# Patient Record
Sex: FEMALE | Race: WHITE | NOT HISPANIC OR LATINO | URBAN - NONMETROPOLITAN AREA
[De-identification: names, ages, dates, MRNs, and addresses within clinical notes are randomized per-mention and may not be internally consistent; named-entity substitution may affect disease eponyms.]

---

## 2017-06-16 ENCOUNTER — OFFICE (OUTPATIENT)
Dept: URBAN - NONMETROPOLITAN AREA CLINIC 13 | Facility: CLINIC | Age: 52
End: 2017-06-16

## 2017-06-16 VITALS
TEMPERATURE: 98.1 F | HEART RATE: 79 BPM | HEIGHT: 65 IN | SYSTOLIC BLOOD PRESSURE: 164 MMHG | DIASTOLIC BLOOD PRESSURE: 94 MMHG | WEIGHT: 251 LBS

## 2017-06-16 VITALS — HEIGHT: 65 IN

## 2017-06-16 DIAGNOSIS — R13.10 DYSPHAGIA, UNSPECIFIED: ICD-10-CM

## 2017-06-16 DIAGNOSIS — R19.7 DIARRHEA, UNSPECIFIED: ICD-10-CM

## 2017-06-16 DIAGNOSIS — R10.84 GENERALIZED ABDOMINAL PAIN: ICD-10-CM

## 2017-06-16 DIAGNOSIS — R10.30 LOWER ABDOMINAL PAIN, UNSPECIFIED: ICD-10-CM

## 2017-06-16 DIAGNOSIS — K21.9 GASTRO-ESOPHAGEAL REFLUX DISEASE WITHOUT ESOPHAGITIS: ICD-10-CM

## 2017-06-16 DIAGNOSIS — K62.5 HEMORRHAGE OF ANUS AND RECTUM: ICD-10-CM

## 2017-06-16 LAB
AMYLASE: 19 U/L — LOW (ref 25–125)
CBC EMERALD: HEMATOCRIT: 46.3 % (ref 37–47)
CBC EMERALD: HEMOGLOBIN: 15.4 G/DL — HIGH (ref 12–14)
CBC EMERALD: LYMPHS %: 29.5 % (ref 20.5–40.5)
CBC EMERALD: LYMPHS ABSOLUTE: 2.9 10*3U/L (ref 1.3–2.9)
CBC EMERALD: MCH: 28.8 PG (ref 27–32)
CBC EMERALD: MCHC: 33.3 G/DL (ref 32–35)
CBC EMERALD: MCV: 86.5 FL (ref 84–100)
CBC EMERALD: MONOS %: 6.8 % (ref 2–10)
CBC EMERALD: MONOS ABSOLUTE: 0.7 10*3U/L (ref 0.3–3.8)
CBC EMERALD: MPV: 10.4 FL (ref 7.4–10.4)
CBC EMERALD: NEUT. %: 63.7 % (ref 43–65)
CBC EMERALD: NEUT. ABSOLUTE: 6.1 10*3U/L — HIGH (ref 2.2–4.8)
CBC EMERALD: PLATELETS: 259 10*3U/L (ref 130–440)
CBC EMERALD: RBC: 5.4 10*6U/L (ref 4.2–5.4)
CBC EMERALD: RDW: 11.6 % (ref 11.5–15.5)
CBC EMERALD: WBC: 9.7 10*3U/L (ref 4.5–10.5)
COMPLETE METABOLIC: ALBUMIN: 4.3 G/DL (ref 3.5–5.2)
COMPLETE METABOLIC: ALK. PHOS: 95 U/L (ref 40–150)
COMPLETE METABOLIC: ALT: 28 U/L (ref 0–55)
COMPLETE METABOLIC: AST: 22 U/L (ref 5–34)
COMPLETE METABOLIC: BUN: 11 MG/DL (ref 7–26)
COMPLETE METABOLIC: CALCIUM: 9.6 MG/DL (ref 8.4–10.2)
COMPLETE METABOLIC: CHLORIDE: 105 MMOL/L (ref 98–107)
COMPLETE METABOLIC: CO2: 26 MEQ/L (ref 22–29)
COMPLETE METABOLIC: CREATININE: 0.8 MG/DL (ref 0.6–1.3)
COMPLETE METABOLIC: GLUCOSE: 189 MG/DL — HIGH (ref 70–99)
COMPLETE METABOLIC: POTASSIUM: 4.4 MMOL/L (ref 3.5–5.3)
COMPLETE METABOLIC: SODIUM: 141 MMOL/L (ref 136–145)
COMPLETE METABOLIC: TOTAL BILIRUBIN: 0.5 MG/DL (ref 0.2–1.2)
COMPLETE METABOLIC: TOTAL PROTEIN: 6.4 G/DL (ref 6.4–8.3)
LIPASE: 10 U/L (ref 8–78)

## 2017-06-16 PROCEDURE — 80053 COMPREHEN METABOLIC PANEL: CPT

## 2017-06-16 PROCEDURE — 83690 ASSAY OF LIPASE: CPT

## 2017-06-16 PROCEDURE — 85025 COMPLETE CBC W/AUTO DIFF WBC: CPT

## 2017-06-16 PROCEDURE — 36415 COLL VENOUS BLD VENIPUNCTURE: CPT

## 2017-06-16 PROCEDURE — 82150 ASSAY OF AMYLASE: CPT

## 2017-06-16 PROCEDURE — 99205 OFFICE O/P NEW HI 60 MIN: CPT

## 2017-06-16 RX ORDER — ONDANSETRON HYDROCHLORIDE 4 MG/1
TABLET, FILM COATED ORAL
Qty: 2 | Refills: 0 | Status: COMPLETED
Start: 2017-06-16 | End: 2017-07-05

## 2017-06-16 RX ORDER — POLYETHYLENE GLYCOL 3350, SODIUM SULFATE ANHYDROUS, SODIUM BICARBONATE, SODIUM CHLORIDE, POTASSIUM CHLORIDE 236; 22.74; 6.74; 5.86; 2.97 G/4L; G/4L; G/4L; G/4L; G/4L
POWDER, FOR SOLUTION ORAL
Qty: 1 | Refills: 0 | Status: COMPLETED
Start: 2017-06-16 | End: 2017-07-05

## 2017-06-16 RX ORDER — BISACODYL 5 MG
TABLET, DELAYED RELEASE (ENTERIC COATED) ORAL
Qty: 8 | Refills: 0 | Status: COMPLETED
Start: 2017-06-16 | End: 2017-07-05

## 2017-07-05 ENCOUNTER — AMBULATORY SURGICAL CENTER (OUTPATIENT)
Dept: URBAN - NONMETROPOLITAN AREA SURGERY 2 | Facility: SURGERY | Age: 52
End: 2017-07-05
Payer: COMMERCIAL

## 2017-07-05 VITALS
RESPIRATION RATE: 16 BRPM | SYSTOLIC BLOOD PRESSURE: 124 MMHG | WEIGHT: 251 LBS | SYSTOLIC BLOOD PRESSURE: 151 MMHG | DIASTOLIC BLOOD PRESSURE: 95 MMHG | OXYGEN SATURATION: 97 % | HEART RATE: 81 BPM | RESPIRATION RATE: 18 BRPM | HEART RATE: 74 BPM | SYSTOLIC BLOOD PRESSURE: 115 MMHG | SYSTOLIC BLOOD PRESSURE: 142 MMHG | HEIGHT: 65 IN | DIASTOLIC BLOOD PRESSURE: 90 MMHG | OXYGEN SATURATION: 91 % | DIASTOLIC BLOOD PRESSURE: 101 MMHG | OXYGEN SATURATION: 96 % | SYSTOLIC BLOOD PRESSURE: 184 MMHG | DIASTOLIC BLOOD PRESSURE: 68 MMHG | HEART RATE: 76 BPM | HEART RATE: 78 BPM | OXYGEN SATURATION: 93 % | DIASTOLIC BLOOD PRESSURE: 70 MMHG | DIASTOLIC BLOOD PRESSURE: 106 MMHG | HEART RATE: 75 BPM | RESPIRATION RATE: 20 BRPM | SYSTOLIC BLOOD PRESSURE: 176 MMHG | OXYGEN SATURATION: 94 %

## 2017-07-05 DIAGNOSIS — Z86.010 PERSONAL HISTORY OF COLONIC POLYPS: ICD-10-CM

## 2017-07-05 PROBLEM — K64.0 FIRST DEGREE HEMORRHOIDS: Status: ACTIVE | Noted: 2017-07-05

## 2017-07-05 PROBLEM — K57.30 DVRTCLOS OF LG INT W/O PERFORATION OR ABSCESS W/O BLEEDING: Status: ACTIVE | Noted: 2017-07-05

## 2017-07-05 PROCEDURE — G0105 COLORECTAL SCRN; HI RISK IND: HCPCS | Performed by: INTERNAL MEDICINE

## 2017-07-05 RX ORDER — HYDROCORTISONE 25 MG/G
CREAM TOPICAL
Qty: 2 | Refills: 2 | Status: ACTIVE
Start: 2017-07-05

## 2017-07-12 ENCOUNTER — AMBULATORY SURGICAL CENTER (OUTPATIENT)
Dept: URBAN - NONMETROPOLITAN AREA SURGERY 2 | Facility: SURGERY | Age: 52
End: 2017-07-12

## 2017-07-12 ENCOUNTER — OFFICE (OUTPATIENT)
Dept: URBAN - NONMETROPOLITAN AREA CLINIC 13 | Facility: CLINIC | Age: 52
End: 2017-07-12

## 2017-07-12 VITALS
SYSTOLIC BLOOD PRESSURE: 129 MMHG | DIASTOLIC BLOOD PRESSURE: 76 MMHG | HEART RATE: 58 BPM | HEART RATE: 59 BPM | HEIGHT: 65 IN | SYSTOLIC BLOOD PRESSURE: 122 MMHG | RESPIRATION RATE: 18 BRPM | DIASTOLIC BLOOD PRESSURE: 84 MMHG | OXYGEN SATURATION: 98 % | RESPIRATION RATE: 20 BRPM | SYSTOLIC BLOOD PRESSURE: 155 MMHG | DIASTOLIC BLOOD PRESSURE: 106 MMHG | DIASTOLIC BLOOD PRESSURE: 99 MMHG | OXYGEN SATURATION: 94 % | DIASTOLIC BLOOD PRESSURE: 72 MMHG | DIASTOLIC BLOOD PRESSURE: 91 MMHG | SYSTOLIC BLOOD PRESSURE: 142 MMHG | DIASTOLIC BLOOD PRESSURE: 86 MMHG | RESPIRATION RATE: 16 BRPM | HEART RATE: 74 BPM | SYSTOLIC BLOOD PRESSURE: 116 MMHG | HEART RATE: 60 BPM | OXYGEN SATURATION: 97 % | OXYGEN SATURATION: 95 % | SYSTOLIC BLOOD PRESSURE: 167 MMHG | WEIGHT: 251 LBS | OXYGEN SATURATION: 96 % | HEART RATE: 72 BPM | HEART RATE: 76 BPM | DIASTOLIC BLOOD PRESSURE: 64 MMHG | HEART RATE: 66 BPM | SYSTOLIC BLOOD PRESSURE: 139 MMHG | HEART RATE: 75 BPM

## 2017-07-12 VITALS — HEIGHT: 65 IN

## 2017-07-12 DIAGNOSIS — K29.40 CHRONIC ATROPHIC GASTRITIS WITHOUT BLEEDING: ICD-10-CM

## 2017-07-12 DIAGNOSIS — K21.9 GASTRO-ESOPHAGEAL REFLUX DISEASE WITHOUT ESOPHAGITIS: ICD-10-CM

## 2017-07-12 DIAGNOSIS — K31.89 OTHER DISEASES OF STOMACH AND DUODENUM: ICD-10-CM

## 2017-07-12 DIAGNOSIS — K30 FUNCTIONAL DYSPEPSIA: ICD-10-CM

## 2017-07-12 DIAGNOSIS — R13.10 DYSPHAGIA, UNSPECIFIED: ICD-10-CM

## 2017-07-12 DIAGNOSIS — R10.84 GENERALIZED ABDOMINAL PAIN: ICD-10-CM

## 2017-07-12 DIAGNOSIS — K29.70 GASTRITIS, UNSPECIFIED, WITHOUT BLEEDING: ICD-10-CM

## 2017-07-12 PROCEDURE — 43239 EGD BIOPSY SINGLE/MULTIPLE: CPT | Performed by: INTERNAL MEDICINE

## 2017-07-12 PROCEDURE — 88312 SPECIAL STAINS GROUP 1: CPT | Mod: TC | Performed by: INTERNAL MEDICINE

## 2017-07-12 PROCEDURE — 74177 CT ABD & PELVIS W/CONTRAST: CPT | Mod: TC

## 2017-07-12 PROCEDURE — 43248 EGD GUIDE WIRE INSERTION: CPT | Performed by: INTERNAL MEDICINE

## 2017-07-12 PROCEDURE — 00740: CPT | Mod: QS,QZ

## 2017-07-12 PROCEDURE — 88305 TISSUE EXAM BY PATHOLOGIST: CPT | Mod: TC | Performed by: INTERNAL MEDICINE

## 2017-07-12 RX ORDER — ELUXADOLINE 100 MG/1
TABLET, FILM COATED ORAL
Qty: 0 | Refills: 0 | Status: COMPLETED
End: 2017-07-12

## 2017-07-12 RX ORDER — DOCUSATE SODIUM AND SENNOSIDES 50; 8.6 MG/1; MG/1
TABLET, FILM COATED ORAL
Qty: 60 | Refills: 5 | Status: ACTIVE
Start: 2017-07-12

## 2017-07-12 RX ORDER — ESOMEPRAZOLE MAGNESIUM 40 MG/1
CAPSULE, DELAYED RELEASE ORAL
Qty: 90 | Refills: 1 | Status: ACTIVE

## 2017-07-12 RX ORDER — RANITIDINE HYDROCHLORIDE 300 MG/1
TABLET, FILM COATED ORAL
Qty: 90 | Refills: 1 | Status: ACTIVE
Start: 2017-07-12

## 2017-07-12 RX ORDER — POLYETHYLENE GLYCOL 3350 17 G/17G
POWDER, FOR SOLUTION ORAL
Qty: 1 | Refills: 5 | Status: ACTIVE
Start: 2017-07-12

## 2017-07-17 LAB — SURGICAL PROCEDURE: PDF REPORT: (no result)

## 2025-06-16 ENCOUNTER — OFFICE (OUTPATIENT)
Dept: URBAN - NONMETROPOLITAN AREA CLINIC 1 | Facility: CLINIC | Age: 60
End: 2025-06-16
Payer: COMMERCIAL

## 2025-06-16 VITALS
SYSTOLIC BLOOD PRESSURE: 134 MMHG | HEIGHT: 65 IN | DIASTOLIC BLOOD PRESSURE: 98 MMHG | HEART RATE: 89 BPM | WEIGHT: 197 LBS

## 2025-06-16 VITALS — HEIGHT: 65 IN

## 2025-06-16 DIAGNOSIS — K30 FUNCTIONAL DYSPEPSIA: ICD-10-CM

## 2025-06-16 DIAGNOSIS — Z86.0101 PERSONAL HISTORY OF ADENOMATOUS AND SERRATED COLON POLYPS: ICD-10-CM

## 2025-06-16 DIAGNOSIS — K76.0 FATTY (CHANGE OF) LIVER, NOT ELSEWHERE CLASSIFIED: ICD-10-CM

## 2025-06-16 DIAGNOSIS — K21.9 GASTRO-ESOPHAGEAL REFLUX DISEASE WITHOUT ESOPHAGITIS: ICD-10-CM

## 2025-06-16 DIAGNOSIS — Z09 ENCOUNTER FOR FOLLOW-UP EXAMINATION AFTER COMPLETED TREATMEN: ICD-10-CM

## 2025-06-16 DIAGNOSIS — R19.7 DIARRHEA, UNSPECIFIED: ICD-10-CM

## 2025-06-16 DIAGNOSIS — R13.10 DYSPHAGIA, UNSPECIFIED: ICD-10-CM

## 2025-06-16 PROBLEM — Z86.010 PERSONAL HISTORY OF COLON POLYPS (TA): Status: ACTIVE | Noted: 2017-07-05

## 2025-06-16 PROCEDURE — 99204 OFFICE O/P NEW MOD 45 MIN: CPT | Performed by: NURSE PRACTITIONER

## 2025-06-16 PROCEDURE — 76981 USE PARENCHYMA: CPT | Performed by: NURSE PRACTITIONER

## 2025-06-16 RX ORDER — POLYETHYLENE GLYCOL 3350, SODIUM SULFATE ANHYDROUS, SODIUM BICARBONATE, SODIUM CHLORIDE, POTASSIUM CHLORIDE 236; 22.74; 6.74; 5.86; 2.97 G/4L; G/4L; G/4L; G/4L; G/4L
POWDER, FOR SOLUTION ORAL
Qty: 4000 | Refills: 0 | Status: COMPLETED
Start: 2025-06-16 | End: 2025-07-08

## 2025-06-16 NOTE — SERVICEHPINOTES
RUDDY RAHMAN   is a   60   year old  female   here today ,   To schedule a colonoscopy for colon cancer screening purposes.  Last colonoscopy was in 2017 and normal.  A 5 year interval was suggested at that time by the endoscopist for personal history of colon polyps that were found in 2014.
vilma esparza      She reports having postprandial diarrhea multiple times per day.  She has a diabetic and currently on insulin.  She states she had stool studies a year 2 ago at a walk-in clinic but is not sure what she was tested for.  She does report some steatorrhea.  She also has a long history of smoking. 
vilma esparza   she complains of heartburn, dysphagia, dyspepsia, and abdominal pain.  She continues to take medication for gastroesophageal reflux disease with intermittent relief.  She denies any melena or hematemesis.  Dysphagia is worse with food that is dry or solid and texture.  Dysphagia usually happens several times a week.
vilma esparza    She states she has had a recent CT scan at the [facility].  We will obtain a copy of that report.  She states she has a known history of fatty liver disease.  In further interview sounds like her blood sugars are also not under good control.  She is currently seeing endocrinologist and has tried multiple different regimens for management of her diabetes.  She does not adhere to a fatty liver diet nor does she adhere to a diabetic diet.  She states she drinks at least 6 or 7 sodas per day, she snacks on fast food and processed foods all day and eats 3 meals.  She states she drinks about 24 oz of milk on a daily basis.
vilma esparza

## 2025-06-16 NOTE — SERVICENOTES
Schedule procedure(s) at [facility]., We discussed risks and benefits of EGD and colonoscopy.  I explained the risk of bleeding, infection, perforation requiring emergent surgery as well as anesthesia related complications including heart attack, stroke, or pneumonia.  Patient voiced understanding and agrees to proceed.

## 2025-07-08 ENCOUNTER — AMBULATORY SURGICAL CENTER (OUTPATIENT)
Dept: URBAN - NONMETROPOLITAN AREA SURGERY 1 | Facility: SURGERY | Age: 60
End: 2025-07-08
Payer: COMMERCIAL

## 2025-07-08 VITALS
DIASTOLIC BLOOD PRESSURE: 88 MMHG | RESPIRATION RATE: 14 BRPM | HEART RATE: 73 BPM | TEMPERATURE: 98.4 F | DIASTOLIC BLOOD PRESSURE: 74 MMHG | DIASTOLIC BLOOD PRESSURE: 81 MMHG | SYSTOLIC BLOOD PRESSURE: 155 MMHG | HEART RATE: 71 BPM | DIASTOLIC BLOOD PRESSURE: 94 MMHG | DIASTOLIC BLOOD PRESSURE: 88 MMHG | OXYGEN SATURATION: 99 % | OXYGEN SATURATION: 97 % | DIASTOLIC BLOOD PRESSURE: 88 MMHG | RESPIRATION RATE: 16 BRPM | HEART RATE: 82 BPM | HEART RATE: 71 BPM | SYSTOLIC BLOOD PRESSURE: 132 MMHG | HEART RATE: 83 BPM | HEIGHT: 65 IN | SYSTOLIC BLOOD PRESSURE: 130 MMHG | HEART RATE: 84 BPM | RESPIRATION RATE: 14 BRPM | SYSTOLIC BLOOD PRESSURE: 155 MMHG | HEIGHT: 65 IN | HEART RATE: 82 BPM | TEMPERATURE: 98.4 F | HEART RATE: 83 BPM | SYSTOLIC BLOOD PRESSURE: 132 MMHG | OXYGEN SATURATION: 99 % | HEART RATE: 82 BPM | DIASTOLIC BLOOD PRESSURE: 81 MMHG | RESPIRATION RATE: 16 BRPM | WEIGHT: 200 LBS | DIASTOLIC BLOOD PRESSURE: 78 MMHG | RESPIRATION RATE: 20 BRPM | RESPIRATION RATE: 18 BRPM | RESPIRATION RATE: 21 BRPM | HEART RATE: 73 BPM | DIASTOLIC BLOOD PRESSURE: 72 MMHG | HEART RATE: 84 BPM | RESPIRATION RATE: 21 BRPM | SYSTOLIC BLOOD PRESSURE: 132 MMHG | SYSTOLIC BLOOD PRESSURE: 130 MMHG | OXYGEN SATURATION: 99 % | RESPIRATION RATE: 20 BRPM | HEART RATE: 71 BPM | DIASTOLIC BLOOD PRESSURE: 94 MMHG | DIASTOLIC BLOOD PRESSURE: 78 MMHG | TEMPERATURE: 98.4 F | OXYGEN SATURATION: 98 % | SYSTOLIC BLOOD PRESSURE: 138 MMHG | RESPIRATION RATE: 20 BRPM | SYSTOLIC BLOOD PRESSURE: 130 MMHG | SYSTOLIC BLOOD PRESSURE: 155 MMHG | HEART RATE: 83 BPM | SYSTOLIC BLOOD PRESSURE: 134 MMHG | HEART RATE: 73 BPM | OXYGEN SATURATION: 98 % | DIASTOLIC BLOOD PRESSURE: 72 MMHG | RESPIRATION RATE: 14 BRPM | DIASTOLIC BLOOD PRESSURE: 94 MMHG | HEIGHT: 65 IN | DIASTOLIC BLOOD PRESSURE: 74 MMHG | DIASTOLIC BLOOD PRESSURE: 81 MMHG | WEIGHT: 200 LBS | RESPIRATION RATE: 18 BRPM | OXYGEN SATURATION: 97 % | DIASTOLIC BLOOD PRESSURE: 72 MMHG | SYSTOLIC BLOOD PRESSURE: 138 MMHG | SYSTOLIC BLOOD PRESSURE: 134 MMHG | SYSTOLIC BLOOD PRESSURE: 134 MMHG | HEART RATE: 84 BPM | RESPIRATION RATE: 21 BRPM | DIASTOLIC BLOOD PRESSURE: 78 MMHG | SYSTOLIC BLOOD PRESSURE: 138 MMHG | WEIGHT: 200 LBS | OXYGEN SATURATION: 97 % | OXYGEN SATURATION: 98 % | DIASTOLIC BLOOD PRESSURE: 74 MMHG | RESPIRATION RATE: 16 BRPM | RESPIRATION RATE: 18 BRPM

## 2025-07-08 DIAGNOSIS — D12.2 BENIGN NEOPLASM OF ASCENDING COLON: ICD-10-CM

## 2025-07-08 DIAGNOSIS — R19.7 DIARRHEA, UNSPECIFIED: ICD-10-CM

## 2025-07-08 DIAGNOSIS — D12.3 BENIGN NEOPLASM OF TRANSVERSE COLON: ICD-10-CM

## 2025-07-08 DIAGNOSIS — R13.10 DYSPHAGIA, UNSPECIFIED: ICD-10-CM

## 2025-07-08 DIAGNOSIS — K31.7 POLYP OF STOMACH AND DUODENUM: ICD-10-CM

## 2025-07-08 DIAGNOSIS — K57.30 DIVERTICULOSIS OF LARGE INTESTINE WITHOUT PERFORATION OR ABS: ICD-10-CM

## 2025-07-08 DIAGNOSIS — K21.9 GASTRO-ESOPHAGEAL REFLUX DISEASE WITHOUT ESOPHAGITIS: ICD-10-CM

## 2025-07-08 DIAGNOSIS — Z86.0101 PERSONAL HISTORY OF ADENOMATOUS AND SERRATED COLON POLYPS: ICD-10-CM

## 2025-07-08 PROBLEM — K63.5 POLYP OF COLON: Status: ACTIVE | Noted: 2025-07-08

## 2025-07-08 PROCEDURE — 45385 COLONOSCOPY W/LESION REMOVAL: CPT | Performed by: INTERNAL MEDICINE

## 2025-07-08 PROCEDURE — 43248 EGD GUIDE WIRE INSERTION: CPT | Performed by: INTERNAL MEDICINE

## 2025-07-08 PROCEDURE — 45380 COLONOSCOPY AND BIOPSY: CPT | Mod: 59 | Performed by: INTERNAL MEDICINE

## 2025-07-08 PROCEDURE — 43239 EGD BIOPSY SINGLE/MULTIPLE: CPT | Mod: 59 | Performed by: INTERNAL MEDICINE

## 2025-07-08 PROCEDURE — 43248 EGD GUIDE WIRE INSERTION: CPT | Mod: 51 | Performed by: INTERNAL MEDICINE

## 2025-07-08 RX ADMIN — PROPOFOL 500 MG: 10 INJECTION, EMULSION INTRAVENOUS at 08:51

## 2025-07-11 LAB
GASTRO ONE PATHOLOGY: PDF REPORT: (no result)